# Patient Record
Sex: MALE | Race: WHITE | Employment: UNEMPLOYED | URBAN - METROPOLITAN AREA
[De-identification: names, ages, dates, MRNs, and addresses within clinical notes are randomized per-mention and may not be internally consistent; named-entity substitution may affect disease eponyms.]

---

## 2019-10-16 ENCOUNTER — HOSPITAL ENCOUNTER (OUTPATIENT)
Dept: OTHER | Facility: HOSPITAL | Age: 39
Discharge: HOME | End: 2019-10-16
Attending: NURSE PRACTITIONER

## 2019-10-17 ENCOUNTER — OFFICE VISIT (OUTPATIENT)
Dept: ORTHOPEDIC SURGERY | Facility: CLINIC | Age: 39
End: 2019-10-17

## 2019-10-17 VITALS — BODY MASS INDEX: 24.92 KG/M2 | WEIGHT: 158.8 LBS | TEMPERATURE: 98.6 F | HEIGHT: 67 IN

## 2019-10-17 DIAGNOSIS — S62.211A CLOSED BENNETT'S FRACTURE OF RIGHT THUMB, INITIAL ENCOUNTER: Primary | ICD-10-CM

## 2019-10-17 PROCEDURE — 99203 OFFICE O/P NEW LOW 30 MIN: CPT | Performed by: ORTHOPAEDIC SURGERY

## 2019-10-17 PROCEDURE — 26600 TREAT METACARPAL FRACTURE: CPT | Performed by: ORTHOPAEDIC SURGERY
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
## 2019-10-23 PROBLEM — S62.211A: Status: ACTIVE | Noted: 2019-10-23

## 2019-11-14 ENCOUNTER — OFFICE VISIT (OUTPATIENT)
Dept: ORTHOPEDIC SURGERY | Facility: CLINIC | Age: 39
End: 2019-11-14

## 2019-11-14 VITALS — TEMPERATURE: 96.7 F | WEIGHT: 161 LBS | HEIGHT: 67 IN | BODY MASS INDEX: 25.27 KG/M2

## 2019-11-14 DIAGNOSIS — S62.211D CLOSED BENNETT'S FRACTURE OF RIGHT THUMB WITH ROUTINE HEALING, SUBSEQUENT ENCOUNTER: Primary | ICD-10-CM

## 2019-11-14 PROCEDURE — 99024 POSTOP FOLLOW-UP VISIT: CPT | Performed by: ORTHOPAEDIC SURGERY

## 2019-11-14 PROCEDURE — 73130 X-RAY EXAM OF HAND: CPT | Performed by: ORTHOPAEDIC SURGERY

## 2019-11-14 NOTE — PROGRESS NOTES
OTHER POST OP GLOBAL     NAME: Nakul Case  ?  : 1980  ?  MRN: [id number]    Chief Complaint   Patient presents with   • Right Hand - Follow-up   • Cast Removal     ?  Date of fracture: 4 weeks ago.    HPI:   Patient returns today for 4 week(s) follow up of right First metacarpal base of the thumb fracture. He has tolerated the cast immobilization well without any decubiti proximally or distally.  Swelling and bruising has gone down as the fracture has progressively healed.. Patient reports doing well with no unusual complaints. Appears to be progressing appropriately.      Review of Systems:      Ortho Exam:   Right hand: The patient's right thumb is less swollen and less tender than before.  He does not have a clinical deformity.  He is neurovascularly intact.  He is able to circumduct his thumb.  He still has deep-seated tenderness at the base of the first metacarpal at the site of the fracture.  Flexor and extensor tendon function is well-preserved.  There is no rotatory malalignment.  The cascade of the fingers is well preserved.      Diagnostic Studies:  xrays obtained today  right Hand X-Ray  Indication: Evaluation of healing of a first metacarpal fracture.  AP, Lateral views  Findings: Acceptable position of the fracture fragments with some early callus formation noted.  no bony lesion  Soft tissues within normal limits  within normal limits joint spaces  Hardware appropriately positioned not applicable      yes prior studies available for comparison.    X-RAY was ordered and reviewed by Yevgeniy Quezada MD      Assessment:  Nakul was seen today for cast removal and follow-up.    Diagnoses and all orders for this visit:    Closed Mcintyre's fracture of right thumb with routine healing, subsequent encounter  -     XR Hand 3+ View Right          Plan   DC the cast and application of a thumb spica E XO brace to immobilize the fracture.  Detailed instructions given to the patient and the [facility]'s   who is present with him today in the office.    No narcotics can be prescribed to this patient since he is an inmate at the [facility].  Continue ice as needed  Aggressive ROM of the thumb.  Reinjury precautions discussed and stressed upon the patient.  Stretching and strengthening exercises  Tylenol 500-1000mg by mouth every 6 hours as needed for pain   Fall precautions  Follow up in 6 week(s)     Date of encounter: 11/14/2019  Yevgeniy Quezada MD

## 2019-12-23 ENCOUNTER — OFFICE VISIT (OUTPATIENT)
Dept: ORTHOPEDIC SURGERY | Facility: CLINIC | Age: 39
End: 2019-12-23

## 2019-12-23 VITALS — WEIGHT: 170 LBS | HEIGHT: 69 IN | BODY MASS INDEX: 25.18 KG/M2

## 2019-12-23 DIAGNOSIS — S62.211D CLOSED BENNETT'S FRACTURE OF RIGHT THUMB WITH ROUTINE HEALING, SUBSEQUENT ENCOUNTER: Primary | ICD-10-CM

## 2019-12-23 PROCEDURE — 73100 X-RAY EXAM OF WRIST: CPT | Performed by: ORTHOPAEDIC SURGERY

## 2019-12-23 PROCEDURE — 99024 POSTOP FOLLOW-UP VISIT: CPT | Performed by: ORTHOPAEDIC SURGERY
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
## 2020-02-20 ENCOUNTER — OFFICE VISIT (OUTPATIENT)
Dept: ORTHOPEDIC SURGERY | Facility: CLINIC | Age: 40
End: 2020-02-20

## 2020-02-20 VITALS — BODY MASS INDEX: 24.44 KG/M2 | WEIGHT: 165 LBS | TEMPERATURE: 99 F | HEIGHT: 69 IN

## 2020-02-20 DIAGNOSIS — R52 PAIN: Primary | ICD-10-CM

## 2020-02-20 DIAGNOSIS — M18.12 ARTHRITIS OF CARPOMETACARPAL (CMC) JOINT OF LEFT THUMB: ICD-10-CM

## 2020-02-20 DIAGNOSIS — S62.501D CLOSED NONDISPLACED FRACTURE OF PHALANX OF RIGHT THUMB WITH ROUTINE HEALING, UNSPECIFIED PHALANX, SUBSEQUENT ENCOUNTER: ICD-10-CM

## 2020-02-20 DIAGNOSIS — S62.211D CLOSED BENNETT'S FRACTURE OF RIGHT THUMB WITH ROUTINE HEALING, SUBSEQUENT ENCOUNTER: ICD-10-CM

## 2020-02-20 PROCEDURE — 99213 OFFICE O/P EST LOW 20 MIN: CPT | Performed by: ORTHOPAEDIC SURGERY

## 2020-02-20 PROCEDURE — 73140 X-RAY EXAM OF FINGER(S): CPT | Performed by: ORTHOPAEDIC SURGERY

## 2020-02-20 NOTE — PROGRESS NOTES
FOLLOW UP VISIT    Patient: Nakul Case  ?  YOB: 1980    MRN: [id number]  ?  Chief Complaint   Patient presents with   • Left Thumb - Pain, Follow-up   • Right Wrist - Pain, Follow-up      ?  HPI: R WRIST FX 10/17/19/ L THUMB  Nakul Fuentes the patient is following up on a first metacarpal base fracture on the right hand.  This was treated initially with a cast followed by a brace.  He states that he does not have a clinical deformity but he still has some pain and discomfort.  He has difficulty with mobilization of the thumb at the CMC joint.  The left thumb is also bothering him at the CMC joint.  He has weakness with  strength and significant swelling of the joint as well.  It appears to me that he is developing CMC joint arthrosis at the base of the left thumb.      Pain Location: left Thumb finger  Radiation: none  Quality: dull, aching  Intensity/Severity: moderate  Duration: 3 months  Onset quality: gradual   Timing: intermittent  Aggravating Factors: repetitive motion  Alleviating Factors: NSAIDs, oral pain medication  Previous Episodes: none mentioned  Associated Symptoms: pain, swelling, clicking/popping  ADLs Affected: grooming/hygiene/toileting/personal care, work related activities  Previous Treatment: Application of a cast on the right hand for the first metacarpal base fracture.    This patient is an established patient.  This problem is not new to this examiner.      Allergies: No Known Allergies    Medications:   Home Medications:  No current outpatient medications on file prior to visit.     No current facility-administered medications on file prior to visit.      Current Medications:  Scheduled Meds:  PRN Meds:.    I have reviewed the patient's medical history in detail and updated the computerized patient record.  Review and summarization of old records include:    History reviewed. No pertinent past medical history.  History reviewed. No pertinent surgical  "history.  Social History     Occupational History   • Not on file   Tobacco Use   • Smoking status: Former Smoker   Substance and Sexual Activity   • Alcohol use: No     Frequency: Never   • Drug use: Not on file   • Sexual activity: Not on file      Family History   Problem Relation Age of Onset   • Osteoporosis Sister    • Rheumatologic disease Sister          Review of Systems       Wt Readings from Last 3 Encounters:   02/20/20 74.8 kg (165 lb)   12/23/19 77.1 kg (170 lb)   11/14/19 73 kg (161 lb)     Ht Readings from Last 3 Encounters:   02/20/20 175.3 cm (69\")   12/23/19 175.3 cm (69\")   11/14/19 170.2 cm (67\")     Body mass index is 24.37 kg/m².  Facility age limit for growth percentiles is 20 years.  Vitals:    02/20/20 1009   Temp: 99 °F (37.2 °C)         Physical Exam  Constitutional: Patient is oriented to person, place, and time. Appears well-developed and well-nourished.   HENT:   Head: Normocephalic and atraumatic.   Eyes: Conjunctivae and EOM are normal. Pupils are equal, round, and reactive to light.   Cardiovascular: Normal rate, regular rhythm, normal heart sounds and intact distal pulses.   Pulmonary/Chest: Effort normal and breath sounds normal.   Musculoskeletal:   See detailed exam below   Neurological: Alert and oriented to person, place, and time. No sensory deficit. Coordination normal.   Skin: Skin is warm and dry. Capillary refill takes less than 2 seconds. No rash noted. No erythema.   Psychiatric: Patient has a normal mood and affect. His behavior is normal. Judgment and thought content normal.   Nursing note and vitals reviewed.      Ortho Exam:   Left hand-CMC joint. The patient is right hand dominant. Tenderness is present at base of the 1st metacarpal. Skin and soft tissues are mildly swollen. Flexor and extensor tendon function is well preserved. Capillary refill is 2 seconds with a brisk return. Axial lading of the joint causes crepitus and pain. Pinch strength is compromised. " Slight subluxation of the 1st metacarpal base is noted. Neurovascular status is intact.  Right thumb: The first metacarpal base fracture seems to be healing nicely.  Patient is neurovascularly intact.  There is no rotatory malalignment.  There is some tenderness over the dorsal aspect of the first metacarpal base.  Skin and soft tissue swelling is decreasing progressively as the fracture is healing.  Flexor and extensor tendon function are both well-preserved.      Diagnostics:  xrays obtained today  bilateral Hand X-Ray  Indication: Evaluation of healing of the right first metacarpal fracture and arthritis of the left CMC joint.  AP, Lateral views  Findings: Acceptable alignment of the right first metacarpal base fracture with callus formation noted.  no bony lesion  Soft tissues within normal limits  decreased joint spaces  Hardware appropriately positioned not applicable      yes prior studies available for comparison.    X-RAY was ordered and reviewed by Yevgeniy Quezada MD      Assessment:  Nakul was seen today for pain, follow-up, pain and follow-up.    Diagnoses and all orders for this visit:    Pain  -     XR Finger 2+ View Left  -     XR Finger 2+ View Right    Closed nondisplaced fracture of phalanx of right thumb with routine healing, unspecified phalanx, subsequent encounter  -     XR Finger 2+ View Right    Closed Mcintyre's fracture of right thumb with routine healing, subsequent encounter  -     Ambulatory Referral to Occupational Therapy    Arthritis of carpometacarpal (CMC) joint of left thumb          Procedures  ?    Plan    · Compression/brace to the right thumb to allow the fracture to heal and consolidate.  · Steroid injection to the CMC joint of the thumb of the left side discussed and offered to the patient.  · Note for physical therapy given to the patient to start PT on the thumb twice a week to help improve the range of motion and the strength of function as well.  · Rest, ice, compression,  and elevation (RICE) therapy  · Stretching and strengthening exercises  · Tylenol 500-1000mg by mouth every 6 hours as needed for pain   · Follow up in 8 week(s)    Date of encounter: 02/20/2020   Yevgeniy Quezada MD

## 2020-02-29 PROBLEM — M18.12 ARTHRITIS OF CARPOMETACARPAL (CMC) JOINT OF LEFT THUMB: Status: ACTIVE | Noted: 2020-02-29

## 2020-02-29 PROBLEM — R52 PAIN: Status: ACTIVE | Noted: 2020-02-29
